# Patient Record
Sex: MALE | Race: WHITE | URBAN - NONMETROPOLITAN AREA
[De-identification: names, ages, dates, MRNs, and addresses within clinical notes are randomized per-mention and may not be internally consistent; named-entity substitution may affect disease eponyms.]

---

## 2017-07-08 ENCOUNTER — HISTORY (OUTPATIENT)
Dept: FAMILY MEDICINE | Facility: OTHER | Age: 27
End: 2017-07-08

## 2017-07-08 ENCOUNTER — OFFICE VISIT - GICH (OUTPATIENT)
Dept: FAMILY MEDICINE | Facility: OTHER | Age: 27
End: 2017-07-08

## 2017-07-08 DIAGNOSIS — J02.9 ACUTE PHARYNGITIS: ICD-10-CM

## 2017-07-08 DIAGNOSIS — J03.90 ACUTE TONSILLITIS: ICD-10-CM

## 2017-07-08 LAB
HETEROPHILE - HISTORICAL: NEGATIVE
STREP A ANTIGEN - HISTORICAL: NEGATIVE

## 2017-07-10 LAB — CULTURE - HISTORICAL: NORMAL

## 2017-08-11 ENCOUNTER — COMMUNICATION - GICH (OUTPATIENT)
Dept: FAMILY MEDICINE | Facility: OTHER | Age: 27
End: 2017-08-11

## 2017-12-27 NOTE — PROGRESS NOTES
Patient Information     Patient Name MRN Sanjay Black [id number] Male 1990      Progress Notes by Kaylah Patterson PA-C at 2017 10:15 AM     Author:  Kaylah Patterson PA-C Service:  (none) Author Type:  PHYS- Physician Assistant     Filed:  7/10/2017 10:45 AM Encounter Date:  2017 Status:  Signed     :  Kaylah Patterson PA-C (PHYS- Physician Assistant)            SUBJECTIVE:    Sanjay Shabazz is a 26 y.o. male who presents for sore throat.    HPI Comments: Sanjay is here with sore throat.  He has had sinus drainage for a couple of weeks with mild nasal congestion that he attributed to allergies.  He still has that but the throat is more painful today.    No fever.  Once or twice noticed muscle aches but they didn't last very long.    No nausea or vomiting. No headache.  No ill contacts.    Denies STI risk.      Past medical history:  NO chronic medical conditions.    Medications:  Ibuprofen for pain and it helps.     Allergies;  No Known Allergies    Social history: He is visiting the area. He is not a smoker.       REVIEW OF SYSTEMS:  Review of Systems   Constitutional: Negative for chills, fever and malaise/fatigue.   HENT: Positive for congestion and sore throat.    Eyes: Negative for discharge and redness.   Respiratory: Negative for cough.    Cardiovascular: Negative for chest pain and palpitations.   Gastrointestinal: Negative for abdominal pain, nausea and vomiting.   Genitourinary: Negative for dysuria and urgency.   Musculoskeletal: Negative for myalgias.   Skin: Negative for rash.   Neurological: Negative for headaches.       OBJECTIVE:  /68  Pulse 60  Temp 98.3  F (36.8  C) (Tympanic)  Wt 81.9 kg (180 lb 9.6 oz)    EXAM:   Physical Exam   Constitutional: He is well-developed, well-nourished, and in no distress.   HENT:   Right Ear: External ear normal.   Left Ear: External ear normal.   TMs intact and gray. Nose congested. Posterior oropharynx markedly erythematous with  exudate and right side slightly more swollen into the soft tissue than on the left.  Uvula is midline.    Eyes: Conjunctivae are normal. Pupils are equal, round, and reactive to light. Right eye exhibits no discharge. Left eye exhibits no discharge.   Neck: Normal range of motion. Neck supple.   Cardiovascular: Normal rate, regular rhythm and normal heart sounds.    Pulmonary/Chest: Effort normal and breath sounds normal. No respiratory distress.   Lymphadenopathy:     He has cervical adenopathy.     Results for orders placed or performed in visit on 07/08/17      RAPID STREP WITH REFLEX CULTURE      Result  Value Ref Range    STREP A ANTIGEN           Negative Negative   MONOSPOT      Result  Value Ref Range    HETEROPHILE               Negative Negative       ASSESSMENT/PLAN:    ICD-10-CM    1. Sore throat J02.9 RAPID STREP WITH REFLEX CULTURE      RAPID STREP WITH REFLEX CULTURE   2. Tonsillitis with exudate J03.90 MONOSPOT      MONOSPOT      amoxicillin-clavulanate 875-125 mg tablet (AUGMENTIN)      THROAT STREP A CULTURE      THROAT STREP A CULTURE      DISCONTINUED: amoxicillin-clavulanate 875-125 mg tablet (AUGMENTIN)        Plan:  Throat culture and monospot negative. Patient declines STI testing or risk of gonococcal infection.  Concern is for progression to tonsillar abscess so opted to treatment with antibiotic therapy despite negative rapid strep test. The patient is in agreement with this plan. If he is not improved in 2 days he will return. If he develops fever, vomiting, or is unable to swallow secretions/fluids, he will go to the emergency room.      Kaylah Patterson PA-C ....................  7/10/2017   10:44 AM

## 2017-12-28 NOTE — TELEPHONE ENCOUNTER
Patient Information     Patient Name MRN Sanjay Black [id number] Male 1990      Telephone Encounter by Robina Zimmer at 2017  1:18 PM     Author:  Robina Zimmer Service:  (none) Author Type:  NURS- Student Practical Nurse     Filed:  2017  1:19 PM Encounter Date:  2017 Status:  Signed     :  Robina Zimmer (NURS- Student Practical Nurse)            Spoke with patient and relayed culture results. Robina Zimmer LPN .............2017  1:18 PM

## 2017-12-29 NOTE — PATIENT INSTRUCTIONS
Patient Information     Patient Name Sanjay Willis [id number] Male 1990      Patient Instructions by Kaylah Patterson PA-C at 2017 10:15 AM     Author:  Kaylah Patterson PA-C Service:  (none) Author Type:  PHYS- Physician Assistant     Filed:  2017 12:08 PM Encounter Date:  2017 Status:  Signed     :  Kaylah Patterson PA-C (HARPAL- Physician Assistant)               Index [place] Related topics   Strep Throat   ________________________________________________________________________  KEY POINTS    Strep throat is an infection of the throat caused by bacteria called group A streptococcus. It is very contagious.    If your healthcare provider suspects you have strep, he or she may prescribe an antibiotic before you have all the results from the lab tests. This medicine may be taken as pills or given as a shot.    Follow the full treatment prescribed by your healthcare provider. If you are taking an antibiotic, take the medicine for as long as your healthcare provider prescribes, even if you feel better. If you stop taking the medicine too soon, you may not kill all of the bacteria and you may get sick again.    Wash your hands often and especially after using the restroom, coughing, sneezing, or blowing your nose. Also wash your hands before eating or touching your mouth, nose, or eyes.  ________________________________________________________________________  What is strep throat?  Strep throat is an infection of the throat caused by bacteria called group A streptococcus.  What is the cause?  Strep infections are very contagious. Strep is passed to others by sneezing, coughing, or touching something with the bacteria on it. Strep can be on surfaces such as toys, cups or plates, doorknobs, or telephones.   Strep throat is common in school-age children. Children under 2 years old and adults who are not exposed to children are much less likely to get strep throat. Strep is most common from  November through April, but it can happen any time of year.  What are the symptoms?  Symptoms may include:    Severe sore throat and painful swallowing    Swollen, tender lymph nodes in the neck    Fever    Headaches    Fine red rash on trunk and arms    Muscle aches    Swollen, red tonsils with white patches  How is it diagnosed?  Your healthcare provider will ask about your symptoms and medical history and examine you. Usually you will have a strep test. Your provider will rub a cotton swab against the back of your throat to get a sample for testing. The results may be available in an hour or less. In some cases, the swab is sent to the lab and tested. If you have an infection, it may take several days to find out what kind of germ is causing it. Knowing what germ is causing the infection helps your provider choose the right medicine to treat it.  How is it treated?  If your healthcare provider suspects you have strep, he or she may prescribe an antibiotic before you have all the results from the lab tests. This medicine may be taken as pills or given as a shot.  The symptoms of strep throat may go away as soon as 24 hours after you start treatment. The symptoms rarely last longer than 5 days.  It is very important to treat strep throat. Not getting treatment for strep throat or not taking all the medicine prescribed can lead to complications that may involve the heart and kidneys.  How can I take care of myself?  Follow the full treatment prescribed by your healthcare provider. In addition:    If you are taking an antibiotic, take the medicine for as long as your healthcare provider prescribes, even if you feel better. If you stop taking the medicine too soon, you may not kill all of the bacteria and you may get sick again.    For a sore throat:    Make sure you have enough fluids. Drink clear soup, cold drinks, and other clear, nutritious liquids. If eating hurts your throat, don't force yourself to eat solid  food. When you are able to eat more foods, choose healthy food to give you strength and to help fight the infection.    Gargle with salt water. You can make your own by mixing 1/2 teaspoon salt with 1 cup of water.    Suck on lozenges or hard candy.    Don't talk a lot. Rest your voice.    Use a humidifier to put more moisture in the air. Avoid steam vaporizers because they can cause burns. Be sure to keep the humidifier clean, as recommended in the 's instructions. It's important to keep bacteria and mold from growing in the water container.    Put warm compresses on your neck.    Do not smoke. Do not breathe second-hand smoke.    If you have a fever, rest and limit your activities until the fever is gone.    Ask your healthcare provider if you can take acetaminophen, aspirin, or ibuprofen to reduce your fever and to relieve pain. Read the label and take as directed. Unless recommended by your healthcare provider, you should not take these medicines for more than 10 days.    Nonsteroidal anti-inflammatory medicines (NSAIDs), such as ibuprofen, naproxen, and aspirin, may cause stomach bleeding and other problems. These risks increase with age.    Acetaminophen may cause liver damage or other problems. Unless recommended by your provider, don't take more than 3000 milligrams (mg) in 24 hours. To make sure you don t take too much, check other medicines you take to see if they also contain acetaminophen. Ask your provider if you need to avoid drinking alcohol while taking this medicine.  Ask your provider:    How and when you will get your test results    How long it will take to recover    If there are activities you should avoid and when you can return to your normal activities    How to take care of yourself at home    What symptoms or problems you should watch for and what to do if you have them  Make sure you know when you should come back for a checkup. Keep all appointments for provider visits or  tests.  How can I help prevent strep throat?   The following suggestions may help you prevent spread of a strep infection to others.    Wash your hands often and especially after using the restroom, coughing, sneezing, or blowing your nose. Also wash your hands before eating or touching your mouth, nose, or eyes.    Avoid close contact with other people, especially kissing and hugging, until you have taken the antibiotic for 24 to 48 hours.    Use paper cups, or separate cups, and paper towels in bathrooms instead of shared drinking cups and hand towels.    Don t share food and eating utensils with others.    Be careful not to let your nose or mouth touch drinking fountains.  Developed by [employer].  Adult Advisor 2016.3 published by [employer].  Last modified: 2016-06-07  Last reviewed: 2014-09-24  This content is reviewed periodically and is subject to change as new health information becomes available. The information is intended to inform and educate and is not a replacement for medical evaluation, advice, diagnosis or treatment by a healthcare professional.  References   Adult Advisor 2016.3 Index    Copyright   2016 [employer], a division of [employer] All rights reserved.

## 2017-12-30 NOTE — NURSING NOTE
Patient Information     Patient Name MRN Sanjay Black [id number] Male 1990      Nursing Note by Jannie Presley at 2017 10:15 AM     Author:  Jannie Presley  Service:  (none) Author Type:  (none)     Filed:  2017 11:13 AM  Encounter Date:  2017 Status:  Addendum     :  Jannie Presley        Related Notes: Original Note by Jannie Presley filed at 2017 11:11 AM            Patient states that he has had a sore throat for about 4-5 days.   Hard to swallow.  Feels like something is stuck  Jannie Presley LPN 2017 10:57 AM    Verbal orders per LAKIA Patterson PA-C read back.    Orders Placed This Encounter      RAPID STREP WITH REFLEX CULTURE  Jannie Presley LPN 2017 11:12 AM

## 2018-01-27 VITALS
WEIGHT: 180.6 LBS | SYSTOLIC BLOOD PRESSURE: 124 MMHG | DIASTOLIC BLOOD PRESSURE: 68 MMHG | TEMPERATURE: 98.3 F | HEART RATE: 60 BPM